# Patient Record
Sex: FEMALE | Race: BLACK OR AFRICAN AMERICAN | NOT HISPANIC OR LATINO | URBAN - METROPOLITAN AREA
[De-identification: names, ages, dates, MRNs, and addresses within clinical notes are randomized per-mention and may not be internally consistent; named-entity substitution may affect disease eponyms.]

---

## 2021-08-31 ENCOUNTER — OFFICE VISIT (OUTPATIENT)
Dept: URBAN - METROPOLITAN AREA CLINIC 17 | Facility: CLINIC | Age: 56
End: 2021-08-31
Payer: COMMERCIAL

## 2021-08-31 ENCOUNTER — WEB ENCOUNTER (OUTPATIENT)
Dept: URBAN - METROPOLITAN AREA CLINIC 17 | Facility: CLINIC | Age: 56
End: 2021-08-31

## 2021-08-31 DIAGNOSIS — Z12.11 SCREEN FOR COLON CANCER: ICD-10-CM

## 2021-08-31 DIAGNOSIS — E66.01 MORBID OBESITY: ICD-10-CM

## 2021-08-31 DIAGNOSIS — K57.30 DIVERTICULA OF COLON: ICD-10-CM

## 2021-08-31 DIAGNOSIS — R73.03 PRE-DIABETES: ICD-10-CM

## 2021-08-31 PROBLEM — 238136002: Status: ACTIVE | Noted: 2021-08-31

## 2021-08-31 PROBLEM — 733657002: Status: ACTIVE | Noted: 2021-08-31

## 2021-08-31 PROCEDURE — 99203 OFFICE O/P NEW LOW 30 MIN: CPT | Performed by: INTERNAL MEDICINE

## 2021-08-31 RX ORDER — IBUPROFEN 800 MG/1
TAKE 1 TABLET BY ORAL ROUTE AS NEEDED TABLET ORAL
Qty: 0 | Refills: 0 | Status: ON HOLD | COMMUNITY
Start: 1900-01-01

## 2021-08-31 RX ORDER — BACLOFEN 10 MG/1
1 TABLET AS NEEDED TABLET ORAL TWICE A DAY
Status: ACTIVE | COMMUNITY

## 2021-08-31 RX ORDER — DULOXETINE 30 MG/1
1 CAPSULE CAPSULE, DELAYED RELEASE PELLETS ORAL ONCE A DAY
Status: ACTIVE | COMMUNITY

## 2021-08-31 NOTE — HPI-TODAY'S VISIT:
8/31/21 56yo lady pt of DR Randy Arvizu.  Referred for colonoscopy. Reviewed colon report from 12/2015.  No fhx of colon CA. Regular BMs daily. no blood in stool.

## 2023-01-25 ENCOUNTER — OFFICE VISIT (OUTPATIENT)
Dept: URBAN - METROPOLITAN AREA SURGERY CENTER 16 | Facility: SURGERY CENTER | Age: 58
End: 2023-01-25

## 2023-02-21 ENCOUNTER — OFFICE VISIT (OUTPATIENT)
Dept: URBAN - METROPOLITAN AREA SURGERY CENTER 16 | Facility: SURGERY CENTER | Age: 58
End: 2023-02-21
Payer: COMMERCIAL

## 2023-02-21 ENCOUNTER — CLAIMS CREATED FROM THE CLAIM WINDOW (OUTPATIENT)
Dept: URBAN - METROPOLITAN AREA CLINIC 4 | Facility: CLINIC | Age: 58
End: 2023-02-21
Payer: COMMERCIAL

## 2023-02-21 DIAGNOSIS — Z12.11 COLON CANCER SCREENING: ICD-10-CM

## 2023-02-21 DIAGNOSIS — K63.89 APPENDICITIS EPIPLOICA: ICD-10-CM

## 2023-02-21 DIAGNOSIS — K63.89 OTHER SPECIFIED DISEASES OF INTESTINE: ICD-10-CM

## 2023-02-21 PROCEDURE — G8907 PT DOC NO EVENTS ON DISCHARG: HCPCS | Performed by: INTERNAL MEDICINE

## 2023-02-21 PROCEDURE — 45380 COLONOSCOPY AND BIOPSY: CPT | Performed by: INTERNAL MEDICINE

## 2023-02-21 PROCEDURE — 88305 TISSUE EXAM BY PATHOLOGIST: CPT | Performed by: PATHOLOGY

## 2023-02-21 RX ORDER — IBUPROFEN 800 MG/1
TAKE 1 TABLET BY ORAL ROUTE AS NEEDED TABLET ORAL
Qty: 0 | Refills: 0 | Status: ON HOLD | COMMUNITY
Start: 1900-01-01

## 2023-02-21 RX ORDER — BACLOFEN 10 MG/1
1 TABLET AS NEEDED TABLET ORAL TWICE A DAY
Status: ACTIVE | COMMUNITY

## 2023-02-21 RX ORDER — DULOXETINE 30 MG/1
1 CAPSULE CAPSULE, DELAYED RELEASE PELLETS ORAL ONCE A DAY
Status: ACTIVE | COMMUNITY

## 2023-09-14 ENCOUNTER — TELEPHONE ENCOUNTER (OUTPATIENT)
Dept: URBAN - METROPOLITAN AREA CLINIC 105 | Facility: CLINIC | Age: 58
End: 2023-09-14

## 2023-09-21 ENCOUNTER — OFFICE VISIT (OUTPATIENT)
Dept: URBAN - METROPOLITAN AREA CLINIC 17 | Facility: CLINIC | Age: 58
End: 2023-09-21
Payer: COMMERCIAL

## 2023-09-21 VITALS
SYSTOLIC BLOOD PRESSURE: 121 MMHG | HEART RATE: 90 BPM | BODY MASS INDEX: 43.32 KG/M2 | HEIGHT: 65 IN | TEMPERATURE: 97.3 F | DIASTOLIC BLOOD PRESSURE: 78 MMHG | WEIGHT: 260 LBS

## 2023-09-21 DIAGNOSIS — Z12.11 SCREEN FOR COLON CANCER: ICD-10-CM

## 2023-09-21 DIAGNOSIS — K57.30 DIVERTICULA OF COLON: ICD-10-CM

## 2023-09-21 DIAGNOSIS — E66.01 MORBID OBESITY: ICD-10-CM

## 2023-09-21 DIAGNOSIS — K62.5 RECTAL BLEEDING: ICD-10-CM

## 2023-09-21 DIAGNOSIS — R73.03 PRE-DIABETES: ICD-10-CM

## 2023-09-21 PROCEDURE — 99214 OFFICE O/P EST MOD 30 MIN: CPT | Performed by: INTERNAL MEDICINE

## 2023-09-21 RX ORDER — IBUPROFEN 800 MG/1
TAKE 1 TABLET BY ORAL ROUTE AS NEEDED TABLET ORAL
Qty: 0 | Refills: 0 | Status: ON HOLD | COMMUNITY
Start: 1900-01-01

## 2023-09-21 RX ORDER — DULOXETINE 30 MG/1
1 CAPSULE CAPSULE, DELAYED RELEASE PELLETS ORAL ONCE A DAY
Status: ACTIVE | COMMUNITY

## 2023-09-21 RX ORDER — BACLOFEN 10 MG/1
1 TABLET AS NEEDED TABLET ORAL TWICE A DAY
Status: ACTIVE | COMMUNITY

## 2023-09-21 NOTE — HPI-TODAY'S VISIT:
8/31/21 54yo lady pt of DR Randy Arvizu.  Referred for colonoscopy. Reviewed colon report from 12/2015.  No fhx of colon CA. Regular BMs daily. no blood in stool.  9/2023 Here for rectal bleeding Day after labor day - had a lot of rectal bleeding "it was a lot" with stool at first but 2 more episodes with "straight blood" She was concerned - saw PCP. Told to go to ER Reviewed ER notes. Labs CBC Hb 13.6, AST 41 and ALT 97/ Pt says not aware of elevated transaminases.  She was told to see Dr Hammond who then discussed pt with me.  She was told to stop taking diclofenac and ASA for her fibromyalgia.  Says "depending on what I am eating I am doing ok" Says she had another episode 3 days after ER visit after eating sushi - passed soft stools and then blood in stool. Happened again on 9/14/23.  Says BMs in between episodes are sometimes constipated. Has a BM daily and hard.  Drinks about 64 ounces of water a day, does not eat a lot of fiber, no exercise.

## 2023-09-22 ENCOUNTER — LAB OUTSIDE AN ENCOUNTER (OUTPATIENT)
Dept: URBAN - METROPOLITAN AREA CLINIC 17 | Facility: CLINIC | Age: 58
End: 2023-09-22

## 2023-09-26 ENCOUNTER — TELEPHONE ENCOUNTER (OUTPATIENT)
Dept: URBAN - METROPOLITAN AREA CLINIC 17 | Facility: CLINIC | Age: 58
End: 2023-09-26

## 2023-09-28 ENCOUNTER — OFFICE VISIT (OUTPATIENT)
Dept: URBAN - METROPOLITAN AREA CLINIC 17 | Facility: CLINIC | Age: 58
End: 2023-09-28

## 2023-09-28 RX ORDER — DULOXETINE 30 MG/1
1 CAPSULE CAPSULE, DELAYED RELEASE PELLETS ORAL ONCE A DAY
COMMUNITY

## 2023-09-28 RX ORDER — IBUPROFEN 800 MG/1
TAKE 1 TABLET BY ORAL ROUTE AS NEEDED TABLET ORAL
Qty: 0 | Refills: 0 | COMMUNITY
Start: 1900-01-01

## 2023-09-28 RX ORDER — BACLOFEN 10 MG/1
1 TABLET AS NEEDED TABLET ORAL TWICE A DAY
COMMUNITY

## 2023-10-03 LAB
ALBUMIN/GLOBULIN RATIO: 1.5
ALBUMIN: 3.9
ALKALINE PHOSPHATASE: 55
ALT (SGPT): 13
AST (SGOT): 18
BILIRUBIN, DIRECT: 0.1
BILIRUBIN, INDIRECT: 0.5
BILIRUBIN, TOTAL: 0.6
GLOBULIN: 2.6
HBSAG SCREEN: (no result)
HEP A AB, IGM: (no result)
HEP B CORE AB, IGM: (no result)
HEPATITIS C ANTIBODY: (no result)
PROTEIN, TOTAL: 6.5

## 2023-10-18 ENCOUNTER — CLAIMS CREATED FROM THE CLAIM WINDOW (OUTPATIENT)
Dept: URBAN - METROPOLITAN AREA CLINIC 17 | Facility: CLINIC | Age: 58
End: 2023-10-18
Payer: COMMERCIAL

## 2023-10-18 ENCOUNTER — DASHBOARD ENCOUNTERS (OUTPATIENT)
Age: 58
End: 2023-10-18

## 2023-10-18 VITALS
WEIGHT: 257 LBS | HEIGHT: 65 IN | TEMPERATURE: 97.1 F | HEART RATE: 86 BPM | BODY MASS INDEX: 42.82 KG/M2 | DIASTOLIC BLOOD PRESSURE: 67 MMHG | SYSTOLIC BLOOD PRESSURE: 130 MMHG

## 2023-10-18 DIAGNOSIS — K62.5 RECTAL BLEEDING: ICD-10-CM

## 2023-10-18 DIAGNOSIS — E66.01 MORBID OBESITY: ICD-10-CM

## 2023-10-18 DIAGNOSIS — K57.30 DIVERTICULA OF COLON: ICD-10-CM

## 2023-10-18 DIAGNOSIS — K76.0 STEATOSIS, LIVER: ICD-10-CM

## 2023-10-18 PROBLEM — 197321007: Status: ACTIVE | Noted: 2023-10-18

## 2023-10-18 PROBLEM — 365767001: Status: ACTIVE | Noted: 2023-10-18

## 2023-10-18 PROCEDURE — 99213 OFFICE O/P EST LOW 20 MIN: CPT | Performed by: INTERNAL MEDICINE

## 2023-10-18 PROCEDURE — 99213 OFFICE O/P EST LOW 20 MIN: CPT

## 2023-10-18 RX ORDER — IBUPROFEN 800 MG/1
TAKE 1 TABLET BY ORAL ROUTE AS NEEDED TABLET ORAL
Qty: 0 | Refills: 0 | Status: ACTIVE | COMMUNITY
Start: 1900-01-01

## 2023-10-18 RX ORDER — BACLOFEN 10 MG/1
1 TABLET AS NEEDED TABLET ORAL TWICE A DAY
Status: ACTIVE | COMMUNITY

## 2023-10-18 RX ORDER — DULOXETINE 30 MG/1
1 CAPSULE CAPSULE, DELAYED RELEASE PELLETS ORAL ONCE A DAY
Status: ACTIVE | COMMUNITY

## 2023-10-18 NOTE — HPI-TODAY'S VISIT:
8/31/21 56yo lady pt of DR Randy Arvizu.  Referred for colonoscopy. Reviewed colon report from 12/2015.  No fhx of colon CA. Regular BMs daily. no blood in stool.  9/2023 Here for rectal bleeding Day after labor day - had a lot of rectal bleeding "it was a lot" with stool at first but 2 more episodes with "straight blood" She was concerned - saw PCP. Told to go to ER Reviewed ER notes. Labs CBC Hb 13.6, AST 41 and ALT 97/ Pt says not aware of elevated transaminases.  She was told to see Dr Hammond who then discussed pt with me.  She was told to stop taking diclofenac and ASA for her fibromyalgia.  Says "depending on what I am eating I am doing ok" Says she had another episode 3 days after ER visit after eating sushi - passed soft stools and then blood in stool. Happened again on 9/14/23.  Says BMs in between episodes are sometimes constipated. Has a BM daily and hard.  Drinks about 64 ounces of water a day, does not eat a lot of fiber, no exercise.  10/18/2023 Here for a follow up to go over labs and imaging ordered at previous visit with Dr. Barraza. Liver enzymes and hepatitis panel was ordered. Liver enzymes and hepatitis panel WNL. RUQ US 10/10/2023 showed echogenic liver suggestion liver steatosis. Patient voiced her understanding.  Last visit mentioned rectal bleeding: patient states she has not had another episode since then. States she believes the seeds on the sushi caused inflammation of her hemorrhoids. States since she stopped eating that type of food she hasn't had any issue Currently doing Miralax QD in the morning. Having 2-3 BMs a day. "Depends on what I eat". Denies straining. Admits to incomplete evacuation.  Eating bran cereal, salads, grapes, and apples.  Drinking 90 ounces of water a day.